# Patient Record
Sex: FEMALE | Race: OTHER | HISPANIC OR LATINO | URBAN - METROPOLITAN AREA
[De-identification: names, ages, dates, MRNs, and addresses within clinical notes are randomized per-mention and may not be internally consistent; named-entity substitution may affect disease eponyms.]

---

## 2024-11-04 ENCOUNTER — OFFICE VISIT (OUTPATIENT)
Dept: FAMILY MEDICINE | Facility: CLINIC | Age: 19
End: 2024-11-04

## 2024-11-04 VITALS
TEMPERATURE: 98 F | WEIGHT: 167.63 LBS | SYSTOLIC BLOOD PRESSURE: 95 MMHG | DIASTOLIC BLOOD PRESSURE: 66 MMHG | HEART RATE: 93 BPM | OXYGEN SATURATION: 97 %

## 2024-11-04 DIAGNOSIS — Z32.01 PREGNANCY CONFIRMED BY POSITIVE URINE TEST: Primary | ICD-10-CM

## 2024-11-04 LAB
B-HCG UR QL: POSITIVE
CTP QC/QA: YES

## 2024-11-04 PROCEDURE — 99204 OFFICE O/P NEW MOD 45 MIN: CPT | Mod: PBBFAC

## 2024-11-04 PROCEDURE — 81025 URINE PREGNANCY TEST: CPT | Mod: PBBFAC

## 2024-11-04 RX ORDER — PRENATAL WITH FERROUS FUM AND FOLIC ACID 3080; 920; 120; 400; 22; 1.84; 3; 20; 10; 1; 12; 200; 27; 25; 2 [IU]/1; [IU]/1; MG/1; [IU]/1; MG/1; MG/1; MG/1; MG/1; MG/1; MG/1; UG/1; MG/1; MG/1; MG/1; MG/1
1 TABLET ORAL DAILY
Qty: 30 TABLET | Refills: 11 | Status: CANCELLED | OUTPATIENT
Start: 2024-11-04 | End: 2025-11-04

## 2024-11-04 RX ORDER — PRENATAL WITH FERROUS FUM AND FOLIC ACID 3080; 920; 120; 400; 22; 1.84; 3; 20; 10; 1; 12; 200; 27; 25; 2 [IU]/1; [IU]/1; MG/1; [IU]/1; MG/1; MG/1; MG/1; MG/1; MG/1; MG/1; UG/1; MG/1; MG/1; MG/1; MG/1
1 TABLET ORAL DAILY
Qty: 30 TABLET | Refills: 11 | Status: SHIPPED | OUTPATIENT
Start: 2024-11-04 | End: 2025-11-04

## 2024-11-04 NOTE — PROGRESS NOTES
I have discussed the case with the resident and reviewed the resident's history and physical, assessment, plan, and progress note. I agree with the findings.       Sanjay Blankenship MD  [facility]

## 2024-11-04 NOTE — PROGRESS NOTES
[facility] FM Clinic Note    CHIEF COMPLAINT:  Chief Complaint   Patient presents with    UPT     Khmer  # [id number] used       HISTORY OF  PRESENT ILLNESS:  Shea Andres is a 19 y.o.   who presents to clinic today for urine pregnancy test. Patient reports doing well. No concerns reported today.     LMP - 7/10/2024  PMH - Denies   PSH - None   Social hx - denies alcohol, tobacco or recreational drug use. Lives with significant other and daughter. 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 No acute distress.   Respiratory: clear to ascultation in all lung fields  Cardiovascular:  RRR, no additional heart sounds heard.  ABD: BS +, soft, nontender  Musculoskeletal:  Full range of motion of all extremities  Neurologic:  A&O X 3.           ASSESSMENT/PLAN:    1) Positive urine pregnancy test  Referral sent to [facility] - Initial OB clinic  Continue PNVs. Refills sent to pharmacy of choice   Handout with safe medications in pregnancy provided to patient in Khmer      Follow up in initial OB clinic.      Paula Santos M.D  [facility] Family [facility] Resident, [facility]-II

## 2024-12-12 ENCOUNTER — HOSPITAL ENCOUNTER (OUTPATIENT)
Dept: RADIOLOGY | Facility: HOSPITAL | Age: 19
Discharge: HOME | End: 2024-12-12

## 2024-12-12 ENCOUNTER — OFFICE VISIT (OUTPATIENT)
Dept: FAMILY MEDICINE | Facility: CLINIC | Age: 19
End: 2024-12-12

## 2024-12-12 VITALS
OXYGEN SATURATION: 100 % | TEMPERATURE: 99 F | HEART RATE: 92 BPM | DIASTOLIC BLOOD PRESSURE: 71 MMHG | SYSTOLIC BLOOD PRESSURE: 100 MMHG | WEIGHT: 178 LBS

## 2024-12-12 DIAGNOSIS — Z3A.30 30 WEEKS GESTATION OF PREGNANCY: Primary | ICD-10-CM

## 2024-12-12 DIAGNOSIS — Z34.90 PREGNANCY: ICD-10-CM

## 2024-12-12 DIAGNOSIS — Z32.01 PREGNANCY CONFIRMED BY POSITIVE URINE TEST: ICD-10-CM

## 2024-12-12 DIAGNOSIS — Z3A.22 22 WEEKS GESTATION OF PREGNANCY: ICD-10-CM

## 2024-12-12 LAB
BACTERIA #/AREA URNS AUTO: ABNORMAL /HPF
BILIRUB SERPL-MCNC: NEGATIVE MG/DL
BILIRUB UR QL STRIP.AUTO: NEGATIVE
BLOOD URINE, POC: NEGATIVE
C TRACH DNA SPEC QL NAA+PROBE: DETECTED
CLARITY UR: ABNORMAL
CLARITY, POC UA: CLEAR
COLOR UR AUTO: ABNORMAL
COLOR, POC UA: YELLOW
GLUCOSE 1H P 100 G GLC PO SERPL-MCNC: 61 MG/DL (ref 100–180)
GLUCOSE UR QL STRIP: NEGATIVE
GLUCOSE UR QL STRIP: NORMAL
GROUP & RH: NORMAL
HBV SURFACE AG SERPL QL IA: NONREACTIVE
HCV AB SERPL QL IA: NONREACTIVE
HGB UR QL STRIP: NEGATIVE
HIV 1+2 AB+HIV1 P24 AG SERPL QL IA: NONREACTIVE
HYALINE CASTS #/AREA URNS LPF: ABNORMAL /LPF
INDIRECT COOMBS: NORMAL
KETONES UR QL STRIP: NEGATIVE
KETONES UR QL STRIP: NEGATIVE
LEUKOCYTE ESTERASE UR QL STRIP: 500
LEUKOCYTE ESTERASE URINE, POC: NORMAL
MUCOUS THREADS URNS QL MICRO: ABNORMAL /LPF
N GONORRHOEA DNA SPEC QL NAA+PROBE: NOT DETECTED
NITRITE UR QL STRIP: NEGATIVE
NITRITE, POC UA: NEGATIVE
PH UR STRIP: 7 [PH]
PH, POC UA: 7
PROT UR QL STRIP: NEGATIVE
PROTEIN, POC: NEGATIVE
RBC #/AREA URNS AUTO: ABNORMAL /HPF
SOURCE (OHS): ABNORMAL
SP GR UR STRIP.AUTO: 1.01 (ref 1–1.03)
SPECIFIC GRAVITY, POC UA: 1.02
SPECIMEN OUTDATE: NORMAL
SQUAMOUS #/AREA URNS LPF: ABNORMAL /HPF
T PALLIDUM AB SER QL: NONREACTIVE
UROBILINOGEN UR STRIP-ACNC: NORMAL
UROBILINOGEN, POC UA: 0.2
WBC #/AREA URNS AUTO: ABNORMAL /HPF

## 2024-12-12 PROCEDURE — 87491 CHLMYD TRACH DNA AMP PROBE: CPT

## 2024-12-12 PROCEDURE — 36415 COLL VENOUS BLD VENIPUNCTURE: CPT

## 2024-12-12 PROCEDURE — 87340 HEPATITIS B SURFACE AG IA: CPT

## 2024-12-12 PROCEDURE — 86787 VARICELLA-ZOSTER ANTIBODY: CPT

## 2024-12-12 PROCEDURE — 87077 CULTURE AEROBIC IDENTIFY: CPT

## 2024-12-12 PROCEDURE — 82950 GLUCOSE TEST: CPT

## 2024-12-12 PROCEDURE — 99214 OFFICE O/P EST MOD 30 MIN: CPT | Mod: PBBFAC,25

## 2024-12-12 PROCEDURE — 76805 OB US >/= 14 WKS SNGL FETUS: CPT | Mod: TC

## 2024-12-12 PROCEDURE — 85660 RBC SICKLE CELL TEST: CPT

## 2024-12-12 PROCEDURE — 86803 HEPATITIS C AB TEST: CPT

## 2024-12-12 PROCEDURE — 81001 URINALYSIS AUTO W/SCOPE: CPT

## 2024-12-12 PROCEDURE — 81002 URINALYSIS NONAUTO W/O SCOPE: CPT | Mod: PBBFAC

## 2024-12-12 PROCEDURE — 86780 TREPONEMA PALLIDUM: CPT

## 2024-12-12 PROCEDURE — 86901 BLOOD TYPING SEROLOGIC RH(D): CPT

## 2024-12-12 PROCEDURE — 87389 HIV-1 AG W/HIV-1&-2 AB AG IA: CPT

## 2024-12-12 PROCEDURE — 86762 RUBELLA ANTIBODY: CPT

## 2024-12-12 NOTE — PROGRESS NOTES
OB Office Visit Note    Name: Shea Andres  MRN: [id number]  Date: 2024    Subjective:      Chief Complaint: Initial Prenatal Visit (IOB 30w6d)      Shea Andres is a 19 y.o.  at 30+6 with RICH 2025, by U/S    Current issues: has no unusual complaints    Chronic issues:   None    PMHx: no pertinent PMH reported   PSHx: none  SH: support at home  FHx: No reported pertinent FHx  Meds:   Prior to Admission medications    Medication Sig Start Date End Date Taking? Authorizing Provider   PNV,calcium 72/iron/folic acid (PRENATAL VITAMIN) Tab Take 1 tablet by mouth once daily. 24  Paula Santos MD     Allergies: Review of patient's allergies indicates:  No Known Allergies    Gestational History:   OB History    Para Term  AB Living   2 1 1 0 0 1   SAB IAB Ectopic Multiple Live Births   0 0 0 0 1      # Outcome Date GA Lbr Hans/2nd Weight Sex Type Anes PTL Lv   2 Current            1 Term 21 40w0d   F Vag-Spont   PATI       GYNHx:   LMP: Patient's last menstrual period was 07/10/2024 (exact date).   Menarche at 12   Menstrual Hx: regular,   Hx of birth control: none   Hx of STDs: none   History of Abnormal PAP: No   No result found    Antepartum specific ROS  - Fetal movements: Yes -   - Vaginal bleeding: No  - Vaginal discharge: No  - Loss of fluid: No  - Contractions: No  - Headaches: Yes - 3 days ago, resolved with rest  - Vision changes: No  - Edema: No    Review of Systems  Constitutional: no fever, no chills  CV: no chest pain  RESP: no SOB  : no dysuria, no hematuria  GI: no constipation, no diarrhea, no nausea, no vomiting  Psych: no depression, no anxiety; No SI/HI    Objective:      Vitals:    24 0928   BP: 100/71   BP Location: Right arm   Patient Position: Sitting   Pulse: 92   Temp: 99 °F (37.2 °C)   TempSrc: Oral   SpO2: 100%   Weight: 80.7 kg (178 lb)       General:   RESP: clear to auscultation bilaterally, non labored  CV: regular rate  and rhythm, no murmurs, no edema  ABD: gravid, nontender, BS+ soft, nontender, nondistended, no abnormal masses, no epigastric pain and FHT present  FHTs: 150 bpm;  Fundal height: 30 cm  Cervix: no lesions or cervical motion tenderness    Initial OB Labs: Ordered 12/12/24  - Blood Type and Rh: pending  - Antibody Screen: pending  - CBC H/H: pending  - HIV: pending  - RPR: pending  - GC: pending  - CT: pending  - HBsAg: pending  - HCVAb: pending  - Rubella: pending  - Varicella: pending  - UA & Culture: pending  - Sickle Cell Screen: pending  - PAP: Not indicated    15-20 Weeks: Lab Ordered   - Quad Screen: out of window    - 20 wk anatomy US:    28 Week Lab: Ordered 12/12/24  - 1H GTT:   - Rhogam:   - Date of Tdap:   - CBC H/H:   - RPR:   - BTL consent:     36 Week Lab: Ordered   - CBC H/H:   - RPR:   - GBS Culture:   - HIV:   - Cervical GC:     Imaging    Urine dip:  Lab Results   Component Value Date    COLORU Yellow 12/12/2024    SPECGRAV 1.020 12/12/2024    PHUR 7.0 12/12/2024    WBCUR small 12/12/2024    NITRITE negative 12/12/2024    PROTEINPOC negative 12/12/2024    GLUCOSEUR negative 12/12/2024    KETONESU negative 12/12/2024    UROBILINOGEN 0.2 12/12/2024    BILIRUBINPOC negative 12/12/2024    RBCUR negative 12/12/2024     Assessment/Plan:     Shea was seen today for initial prenatal visit.    Diagnoses and all orders for this visit:    30 weeks gestation of pregnancy  -     Type & Screen; Future  -     Cancel: Chlamydia/GC, PCR  -     Sickle Cell Screen; Future  -     Varicella Zoster Antibody, IgG; Future  -     Rubella Antibody, IgG; Future  -     Hepatitis C Antibody; Future  -     Cancel: Hepatitis B Surface Antigen; Future  -     SYPHILIS ANTIBODY (WITH REFLEX RPR); Future  -     HIV 1/2 Ag/Ab (4th Gen); Future  -     Glucose Tolerance 1 Hour; Future  -     Urinalysis  -     Urine Culture High Risk  -     Glucose Tolerance 1 Hour  -     HIV 1/2 Ag/Ab (4th Gen)  -     SYPHILIS ANTIBODY (WITH REFLEX  RPR)  -     Hepatitis C Antibody  -     Rubella Antibody, IgG  -     Varicella Zoster Antibody, IgG  -     Sickle Cell Screen  -     Type & Screen    Pregnancy confirmed by positive urine test  -     Ambulatory referral/consult to Family Practice    22 weeks gestation of pregnancy  -     POCT URINE DIPSTICK WITHOUT MICROSCOPE  -     Cancel: Sickle Cell Screen; Future  -     Varicella Zoster Antibody, IgG; Future  -     Rubella Antibody, IgG; Future  -     Hepatitis C Antibody; Future  -     Hepatitis B Surface Antigen; Future  -     Cancel: SYPHILIS ANTIBODY (WITH REFLEX RPR); Future  -     HIV 1/2 Ag/Ab (4th Gen); Future  -     Cancel: Type & Screen; Future  -     Chlamydia/GC, PCR  -     Cancel: HIV 1/2 Ag/Ab (4th Gen)  -     Hepatitis B Surface Antigen  -     Cancel: Hepatitis C Antibody  -     Cancel: Rubella Antibody, IgG  -     Cancel: Varicella Zoster Antibody, IgG       Pregnancy  -     POCT urine dipstick without microscope  -     OB Protocol   -     PNVs  -     Urine dip reviewed as above  -     Routine (initial) labs: as mentioned above/pending  -     Mother plans to bottle feed  -     Postpartum contraception discussion: TBD  -     Labor precautions discussed in depth        Return to clinic in Follow up in about 2 weeks (around 12/26/2024) for growth u/s.    Sidney Young MD  [facility] Family Medicine, PGY-2

## 2024-12-12 NOTE — PATIENT INSTRUCTIONS
Well Child Exam    About this topic  A well child exam is a visit with your child's doctor to check your child's health. The doctor will check your child's growth, progress, and shot record. It is also a time for you to ask your child's doctor any questions you have about your child's health. Your child will have a full exam during the office visit. Other things that are sometimes checked are hearing, eyesight, and urine or blood tests. The doctor may give shots during your child's well visit.    General    Getting Ready for a Well Child Exam    A well child exam is a good time for you to talk with your child's doctor about any of these topics:    Eating habits or diet    How your child acts    Sleep issues    Growth    Safety    Vaccines    Toilet training    Teen years    How your child is doing in school or any learning concerns    Home life    You may want to make a written list of the things you want to talk about with your child's doctor. Be sure to bring your list of questions to your child's well visit. You may also want to do some research on your own before your office visit by reading books or looking at Web sites. Other family members, child caregivers, and grandparents may be able to help you too. Your child's doctor may ask also you about your family's health history or if your child is around anyone who smokes.    The Exam    The doctor measures your child's weight, height, and sometimes head size or body mass index (BMI). The doctor plots these numbers on a growth curve. The growth curve gives a picture of your baby's growth at each visit. The doctor may check your child's temperature, blood pressure, breathing, and heart rate. The doctor may listen to your child's heart, lungs, and belly. Your doctor will do a full exam of your child from the head to the toes.    Growth and Development Questions    Your doctor will ask you about your child's progress. The doctor will focus on the skills that are  likely to happen at your child's age. Some of these are motor skills like rolling over, walking, and running, while others are social skills, or how your child interacts with other people. Your child's doctor will also ask you how your child is doing in school.    Help for Parents    Your doctor will talk with you about any concerns you have about your child during this visit. The doctor may also talk with you about:    Getting family help or other support    Ways to help your child's brain growth    How your child plays and acts with others    Ways to help your child exercise    Safety    Eating habits    Vaccines    Quitting smoking    Help if you have a low mood after having a baby    Shots or Vaccines    It is important for your child to get shots on time. This protects from very serious illnesses like pertussis, measles, or some kinds of pneumonia. Sometimes, your child may need more than one dose of vaccine. The vaccines used today are safer than ever. Talk to your doctor if you have any questions or concerns about giving your child vaccines.    Well Child Exam Schedule    The American Academy of Pediatrics (AAP) suggests this plan for well child visits:    [place] (3 to 5 days old)    1 month old    2 months old    4 months old    6 months old    9 months old    12 months old    15 months old    18 months old    2 years old    30 months old    3 years old    4 years old    Once each year until age 21    Well child exams are very important. Since your child is healthy at this visit and it is scheduled ahead of time, you can think about things you want to ask your child's doctor. Be sure to follow the above plan for well child visits as well as any other visits your child's doctor suggests.    Where can I learn more?    Centers for Disease Control and Prevention    http://www.cdc.gov/vaccines     Healthy  Children    https://www.healthychildren.org/English/family-life/health-management/Pages/Well-Child-Care-A-Check-Up-for-Success.aspx    Disclaimer.  This generalized information is a limited summary of diagnosis, treatment, and/or medication information. It is not meant to be comprehensive and should be used as a tool to help the user understand and/or assess potential diagnostic and treatment options. It does NOT include all information about conditions, treatments, medications, side effects, or risks that may apply to a specific patient. It is not intended to be medical advice or a substitute for the medical advice, diagnosis, or treatment of a health care provider based on the health care provider's examination and assessment of a patients specific and unique circumstances. Patients must speak with a health care provider for complete information about their health, medical questions, and treatment options, including any risks or benefits regarding use of medications. This information does not endorse any treatments or medications as safe, effective, or approved for treating a specific patient. UpToDate, Inc. and its affiliates disclaim any warranty or liability relating to this information or the use thereof. The use of this information is governed by the Terms of Use, available at Terms of Use. ©2022 UpToDate, Inc. and its affiliates and/or licensors. All rights reserved.

## 2024-12-13 LAB
HGB S BLD QL SOLY: NEGATIVE
RUBV IGG SERPL IA-ACNC: 0.7
RUBV IGG SERPL QL IA: NEGATIVE
VZV IGG SER IA-ACNC: <0.2
VZV IGG SER QL IA: NEGATIVE

## 2024-12-13 NOTE — PROGRESS NOTES
I have personally reviewed the review of systems (ROS) and past, family and social histories (PFSH) documented above by the resident.  I have reviewed the care furnished by the resident during the encounter, including a review of the patient's medical history, the resident's findings on physical examination, diagnosis, and the treatment plan.  I participated in the management of the patient and was immediately available throughout the encounter.   I was physically present during all key portions of the service(s) provided with the resident.  Services were furnished in a primary care center located in the outpatient department of a [facility].

## 2024-12-14 LAB — BACTERIA UR CULT: ABNORMAL

## 2024-12-26 ENCOUNTER — TELEPHONE (OUTPATIENT)
Dept: FAMILY MEDICINE | Facility: CLINIC | Age: 19
End: 2024-12-26

## 2024-12-26 NOTE — TELEPHONE ENCOUNTER
With assistance of Telugu  Mehul [id number]     Spoke with patient to confirm she was coming to encounter today at approximately 10:40. Pt expressed intent to attend.     Zack Davis MD  [facility]

## 2024-12-26 NOTE — TELEPHONE ENCOUNTER
With assistance of [place] Islander  Tuyet [id number]    Attempted to speak with patient on phone to discuss results. No answer and  left message for patient to call the clinic. Will attempt to schedule follow up.     Shea Andres is a 19 y.o.  at 32w6d with RICH 2025, by Ultrasound  Previous clinical visit   where initial ob labs drawn (8-10 weeks)where pt found urine culture positive for E. Coli, Chlamydia trachomatis PCR detected.  Pt also rubella and varicella non immune.   Quad screen out of window. CBC needed per initial OB labs. Initial US performed but no 20 week anatomy ultrasound found.   Would like to prescribe the following:  Macrobid 100 mg BID for 7 days and repeat urinalysis at next follow up  1 gram Azithromycin single dose PO. Suggest partner therapy then repeat testing in 4 weeks.    postpartum rubella and varicella immunization to be discussed    Zack Davis MD  [facility], [facility]

## 2024-12-27 ENCOUNTER — TELEPHONE (OUTPATIENT)
Dept: PEDIATRICS UNIT | Facility: HOSPITAL | Age: 19
End: 2024-12-27

## 2024-12-27 NOTE — TELEPHONE ENCOUNTER
Attempted to call patient w/Language Line services ([id number] [facility]) on 12/27/24 w/no answer. Patient was called twice. Time was approximately 11:30am

## 2024-12-28 ENCOUNTER — TELEPHONE (OUTPATIENT)
Dept: FAMILY MEDICINE | Facility: CLINIC | Age: 19
End: 2024-12-28

## 2024-12-28 NOTE — TELEPHONE ENCOUNTER
With assistance of [place] Islander  Jeimy [id number]     Attempted to call patient 24 at 1419 to discuss recent laboratory findings; however, phone number is no longer listed.     Front staff notified on 24 and asked to send certified letter to patient.      Shea Andres is a 19 y.o.  at 32w6d with RICH 2025, by Ultrasound  Previous clinical visit   where initial ob labs drawn (8-10 weeks)where pt found urine culture positive for E. Coli, Chlamydia trachomatis PCR detected.  Pt also rubella and varicella non immune.   Quad screen out of window. CBC needed per initial OB labs. Initial US performed but no 20 week anatomy ultrasound found.   Would like to prescribe the following:  Macrobid 100 mg BID for 7 days and repeat urinalysis at next follow up  1 gram Azithromycin single dose PO. Suggest partner therapy then repeat testing in 4 weeks.    postpartum rubella and varicella immunization to be discussed     Zack Davis MD  [facility], [facility]